# Patient Record
Sex: MALE | Race: WHITE | NOT HISPANIC OR LATINO | URBAN - METROPOLITAN AREA
[De-identification: names, ages, dates, MRNs, and addresses within clinical notes are randomized per-mention and may not be internally consistent; named-entity substitution may affect disease eponyms.]

---

## 2023-12-16 ENCOUNTER — EMERGENCY (EMERGENCY)
Facility: HOSPITAL | Age: 68
LOS: 1 days | Discharge: HOME | End: 2023-12-16
Attending: EMERGENCY MEDICINE | Admitting: EMERGENCY MEDICINE
Payer: MEDICARE

## 2023-12-16 VITALS
OXYGEN SATURATION: 100 % | DIASTOLIC BLOOD PRESSURE: 81 MMHG | TEMPERATURE: 98 F | HEART RATE: 80 BPM | SYSTOLIC BLOOD PRESSURE: 123 MMHG | RESPIRATION RATE: 17 BRPM

## 2023-12-16 LAB
ALBUMIN SERPL ELPH-MCNC: 3.9 G/DL — SIGNIFICANT CHANGE UP (ref 3.3–5)
ALBUMIN SERPL ELPH-MCNC: 3.9 G/DL — SIGNIFICANT CHANGE UP (ref 3.3–5)
ALP SERPL-CCNC: 56 U/L — SIGNIFICANT CHANGE UP (ref 40–120)
ALP SERPL-CCNC: 56 U/L — SIGNIFICANT CHANGE UP (ref 40–120)
ALT FLD-CCNC: 14 U/L — SIGNIFICANT CHANGE UP (ref 4–41)
ALT FLD-CCNC: 14 U/L — SIGNIFICANT CHANGE UP (ref 4–41)
ANION GAP SERPL CALC-SCNC: 9 MMOL/L — SIGNIFICANT CHANGE UP (ref 7–14)
ANION GAP SERPL CALC-SCNC: 9 MMOL/L — SIGNIFICANT CHANGE UP (ref 7–14)
AST SERPL-CCNC: 20 U/L — SIGNIFICANT CHANGE UP (ref 4–40)
AST SERPL-CCNC: 20 U/L — SIGNIFICANT CHANGE UP (ref 4–40)
BASOPHILS # BLD AUTO: 0.05 K/UL — SIGNIFICANT CHANGE UP (ref 0–0.2)
BASOPHILS # BLD AUTO: 0.05 K/UL — SIGNIFICANT CHANGE UP (ref 0–0.2)
BASOPHILS NFR BLD AUTO: 0.7 % — SIGNIFICANT CHANGE UP (ref 0–2)
BASOPHILS NFR BLD AUTO: 0.7 % — SIGNIFICANT CHANGE UP (ref 0–2)
BILIRUB SERPL-MCNC: 1.1 MG/DL — SIGNIFICANT CHANGE UP (ref 0.2–1.2)
BILIRUB SERPL-MCNC: 1.1 MG/DL — SIGNIFICANT CHANGE UP (ref 0.2–1.2)
BUN SERPL-MCNC: 6 MG/DL — LOW (ref 7–23)
BUN SERPL-MCNC: 6 MG/DL — LOW (ref 7–23)
CALCIUM SERPL-MCNC: 8.7 MG/DL — SIGNIFICANT CHANGE UP (ref 8.4–10.5)
CALCIUM SERPL-MCNC: 8.7 MG/DL — SIGNIFICANT CHANGE UP (ref 8.4–10.5)
CHLORIDE SERPL-SCNC: 104 MMOL/L — SIGNIFICANT CHANGE UP (ref 98–107)
CHLORIDE SERPL-SCNC: 104 MMOL/L — SIGNIFICANT CHANGE UP (ref 98–107)
CO2 SERPL-SCNC: 27 MMOL/L — SIGNIFICANT CHANGE UP (ref 22–31)
CO2 SERPL-SCNC: 27 MMOL/L — SIGNIFICANT CHANGE UP (ref 22–31)
CREAT SERPL-MCNC: 0.84 MG/DL — SIGNIFICANT CHANGE UP (ref 0.5–1.3)
CREAT SERPL-MCNC: 0.84 MG/DL — SIGNIFICANT CHANGE UP (ref 0.5–1.3)
EGFR: 94 ML/MIN/1.73M2 — SIGNIFICANT CHANGE UP
EGFR: 94 ML/MIN/1.73M2 — SIGNIFICANT CHANGE UP
EOSINOPHIL # BLD AUTO: 0.2 K/UL — SIGNIFICANT CHANGE UP (ref 0–0.5)
EOSINOPHIL # BLD AUTO: 0.2 K/UL — SIGNIFICANT CHANGE UP (ref 0–0.5)
EOSINOPHIL NFR BLD AUTO: 2.7 % — SIGNIFICANT CHANGE UP (ref 0–6)
EOSINOPHIL NFR BLD AUTO: 2.7 % — SIGNIFICANT CHANGE UP (ref 0–6)
GLUCOSE SERPL-MCNC: 89 MG/DL — SIGNIFICANT CHANGE UP (ref 70–99)
GLUCOSE SERPL-MCNC: 89 MG/DL — SIGNIFICANT CHANGE UP (ref 70–99)
HCT VFR BLD CALC: 39 % — SIGNIFICANT CHANGE UP (ref 39–50)
HCT VFR BLD CALC: 39 % — SIGNIFICANT CHANGE UP (ref 39–50)
HGB BLD-MCNC: 13.3 G/DL — SIGNIFICANT CHANGE UP (ref 13–17)
HGB BLD-MCNC: 13.3 G/DL — SIGNIFICANT CHANGE UP (ref 13–17)
IANC: 5 K/UL — SIGNIFICANT CHANGE UP (ref 1.8–7.4)
IANC: 5 K/UL — SIGNIFICANT CHANGE UP (ref 1.8–7.4)
IMM GRANULOCYTES NFR BLD AUTO: 0.4 % — SIGNIFICANT CHANGE UP (ref 0–0.9)
IMM GRANULOCYTES NFR BLD AUTO: 0.4 % — SIGNIFICANT CHANGE UP (ref 0–0.9)
LYMPHOCYTES # BLD AUTO: 1.51 K/UL — SIGNIFICANT CHANGE UP (ref 1–3.3)
LYMPHOCYTES # BLD AUTO: 1.51 K/UL — SIGNIFICANT CHANGE UP (ref 1–3.3)
LYMPHOCYTES # BLD AUTO: 20.3 % — SIGNIFICANT CHANGE UP (ref 13–44)
LYMPHOCYTES # BLD AUTO: 20.3 % — SIGNIFICANT CHANGE UP (ref 13–44)
MCHC RBC-ENTMCNC: 32.7 PG — SIGNIFICANT CHANGE UP (ref 27–34)
MCHC RBC-ENTMCNC: 32.7 PG — SIGNIFICANT CHANGE UP (ref 27–34)
MCHC RBC-ENTMCNC: 34.1 GM/DL — SIGNIFICANT CHANGE UP (ref 32–36)
MCHC RBC-ENTMCNC: 34.1 GM/DL — SIGNIFICANT CHANGE UP (ref 32–36)
MCV RBC AUTO: 95.8 FL — SIGNIFICANT CHANGE UP (ref 80–100)
MCV RBC AUTO: 95.8 FL — SIGNIFICANT CHANGE UP (ref 80–100)
MONOCYTES # BLD AUTO: 0.65 K/UL — SIGNIFICANT CHANGE UP (ref 0–0.9)
MONOCYTES # BLD AUTO: 0.65 K/UL — SIGNIFICANT CHANGE UP (ref 0–0.9)
MONOCYTES NFR BLD AUTO: 8.7 % — SIGNIFICANT CHANGE UP (ref 2–14)
MONOCYTES NFR BLD AUTO: 8.7 % — SIGNIFICANT CHANGE UP (ref 2–14)
NEUTROPHILS # BLD AUTO: 5 K/UL — SIGNIFICANT CHANGE UP (ref 1.8–7.4)
NEUTROPHILS # BLD AUTO: 5 K/UL — SIGNIFICANT CHANGE UP (ref 1.8–7.4)
NEUTROPHILS NFR BLD AUTO: 67.2 % — SIGNIFICANT CHANGE UP (ref 43–77)
NEUTROPHILS NFR BLD AUTO: 67.2 % — SIGNIFICANT CHANGE UP (ref 43–77)
NRBC # BLD: 0 /100 WBCS — SIGNIFICANT CHANGE UP (ref 0–0)
NRBC # BLD: 0 /100 WBCS — SIGNIFICANT CHANGE UP (ref 0–0)
NRBC # FLD: 0 K/UL — SIGNIFICANT CHANGE UP (ref 0–0)
NRBC # FLD: 0 K/UL — SIGNIFICANT CHANGE UP (ref 0–0)
PLATELET # BLD AUTO: 135 K/UL — LOW (ref 150–400)
PLATELET # BLD AUTO: 135 K/UL — LOW (ref 150–400)
POTASSIUM SERPL-MCNC: 4.1 MMOL/L — SIGNIFICANT CHANGE UP (ref 3.5–5.3)
POTASSIUM SERPL-MCNC: 4.1 MMOL/L — SIGNIFICANT CHANGE UP (ref 3.5–5.3)
POTASSIUM SERPL-SCNC: 4.1 MMOL/L — SIGNIFICANT CHANGE UP (ref 3.5–5.3)
POTASSIUM SERPL-SCNC: 4.1 MMOL/L — SIGNIFICANT CHANGE UP (ref 3.5–5.3)
PROT SERPL-MCNC: 6.6 G/DL — SIGNIFICANT CHANGE UP (ref 6–8.3)
PROT SERPL-MCNC: 6.6 G/DL — SIGNIFICANT CHANGE UP (ref 6–8.3)
RBC # BLD: 4.07 M/UL — LOW (ref 4.2–5.8)
RBC # BLD: 4.07 M/UL — LOW (ref 4.2–5.8)
RBC # FLD: 11.8 % — SIGNIFICANT CHANGE UP (ref 10.3–14.5)
RBC # FLD: 11.8 % — SIGNIFICANT CHANGE UP (ref 10.3–14.5)
SODIUM SERPL-SCNC: 140 MMOL/L — SIGNIFICANT CHANGE UP (ref 135–145)
SODIUM SERPL-SCNC: 140 MMOL/L — SIGNIFICANT CHANGE UP (ref 135–145)
TROPONIN T, HIGH SENSITIVITY RESULT: 12 NG/L — SIGNIFICANT CHANGE UP
TROPONIN T, HIGH SENSITIVITY RESULT: 12 NG/L — SIGNIFICANT CHANGE UP
TROPONIN T, HIGH SENSITIVITY RESULT: 13 NG/L — SIGNIFICANT CHANGE UP
TROPONIN T, HIGH SENSITIVITY RESULT: 13 NG/L — SIGNIFICANT CHANGE UP
WBC # BLD: 7.44 K/UL — SIGNIFICANT CHANGE UP (ref 3.8–10.5)
WBC # BLD: 7.44 K/UL — SIGNIFICANT CHANGE UP (ref 3.8–10.5)
WBC # FLD AUTO: 7.44 K/UL — SIGNIFICANT CHANGE UP (ref 3.8–10.5)
WBC # FLD AUTO: 7.44 K/UL — SIGNIFICANT CHANGE UP (ref 3.8–10.5)

## 2023-12-16 PROCEDURE — 99223 1ST HOSP IP/OBS HIGH 75: CPT

## 2023-12-16 PROCEDURE — 71045 X-RAY EXAM CHEST 1 VIEW: CPT | Mod: 26

## 2023-12-16 PROCEDURE — 93010 ELECTROCARDIOGRAM REPORT: CPT

## 2023-12-16 RX ORDER — ASPIRIN/CALCIUM CARB/MAGNESIUM 324 MG
324 TABLET ORAL ONCE
Refills: 0 | Status: COMPLETED | OUTPATIENT
Start: 2023-12-16 | End: 2023-12-16

## 2023-12-16 RX ORDER — ASPIRIN/CALCIUM CARB/MAGNESIUM 324 MG
81 TABLET ORAL DAILY
Refills: 0 | Status: DISCONTINUED | OUTPATIENT
Start: 2023-12-17 | End: 2023-12-20

## 2023-12-16 RX ADMIN — Medication 324 MILLIGRAM(S): at 21:36

## 2023-12-16 NOTE — ED CDU PROVIDER INITIAL DAY NOTE - PHYSICAL EXAMINATION
CONSTITUTIONAL:  Well appearing, awake, alert, oriented to person, place, time/situation and in no apparent distress.  Pt. is objectively comfortable appearing and verbalizing in full, clear, effortless sentences.  ENMT: NC/AT.  Airway patent.  Nasal mucosa clear.  Moist mucous membranes.  Neck supple.  EYES:  Clear OU.  CARDIAC:  Bradycardic rate (50's on monitor), regular rhythm.  Heart sounds S1 S2.  No murmurs, gallops, or rubs.  RESPIRATORY:  Breath sounds clear and equal bilaterally.  No wheezes, no rales, no rhonchi.  GASTROINTESTINAL:  Abdomen soft, non-distended, non-tender.  No rebound, no guarding.  NEUROLOGICAL:  Alert and oriented to person/place/time/situation.  No focal deficits; no tremors noted.   MUSCULOSKELETAL:  Range of motion is not limited.    SKIN:  Skin color unremarkable.  Skin warm, dry, and intact.    PSYCHIATRIC:  Alert and oriented to person/place/time/situation.  Mood and affect WNL.  No apparent risk to self or others.

## 2023-12-16 NOTE — ED CDU PROVIDER INITIAL DAY NOTE - OBJECTIVE STATEMENT
68-year-old male history of HTN, HLD presenting for chest pain.  The patient reports wher he woke up and he took his blood pressure and it was in the 200s systolically.  The patient reports at that time he was having chest pain across the chest.  Not localized to any region that lasted for a few seconds. The patient reports the pain was worse with deep inspiration. The patient did not have any nausea or vomiting with this chest pain and patient did not have any radiation to his arm or jaw.  The patient states that though he has high blood pressure it's normally never this hgh. The patient went to urgent care was given 1 clonidine and sent   The patient reports that he recently had a runny nose and hs been drenched at night. The patient denies nausea, vomiting, fevers, chills, chest pain or shortness of breath.    CDU ANGIE Peter Note----  ED Provider HPI as above, reviewed.  Pt is a 69 yo male, PMH HTN, hyperlipidemia, presented to the ED c/o intermittent transient episodes of anterior chest pain, non-radiating, lasting seconds when occurring; occasionally pt noted some increase in pain when he took a deep breath while pain was occurring, but then these symptoms would resolve when chest pain resolved; pt also noted elevated SBP readings.  In the ED, VSS, pt afebrile.  Initial BP in /81.  EKG sinus bradycardia @ 55 bpm with no ischemic morphology noted.  WBC 7.44, Hb 13.3; CMP unremarkable.  Troponin 12 -----> 13.  CXR was performed; per official radiology report: "...IMPRESSION: Clear lungs.".  Pt was given aspirin; pt was sent to CDU for continued care plan:  Tele monitoring, stress test, echo, Tele Doc of Day evaluation, general observation care / monitoring. 68-year-old male history of HTN, HLD presenting for chest pain.  The patient reports wher he woke up and he took his blood pressure and it was in the 200s systolically.  The patient reports at that time he was having chest pain across the chest.  Not localized to any region that lasted for a few seconds. The patient reports the pain was worse with deep inspiration. The patient did not have any nausea or vomiting with this chest pain and patient did not have any radiation to his arm or jaw.  The patient states that though he has high blood pressure it's normally never this hgh. The patient went to urgent care was given 1 clonidine and sent   The patient reports that he recently had a runny nose and hs been drenched at night. The patient denies nausea, vomiting, fevers, chills, chest pain or shortness of breath.    CDU ANGIE Peter Note----  ED Provider HPI as above, reviewed.  Pt is a 67 yo male, PMH HTN, hyperlipidemia, presented to the ED c/o intermittent transient episodes of anterior chest pain, non-radiating, lasting seconds when occurring; occasionally pt noted some increase in pain when he took a deep breath while pain was occurring, but then these symptoms would resolve when chest pain resolved; pt also noted elevated SBP readings.  In the ED, VSS, pt afebrile.  Initial BP in /81.  EKG sinus bradycardia @ 55 bpm with no ischemic morphology noted.  WBC 7.44, Hb 13.3; CMP unremarkable.  Troponin 12 -----> 13.  CXR was performed; per official radiology report: "...IMPRESSION: Clear lungs.".  Pt was given aspirin; pt was sent to CDU for continued care plan:  Tele monitoring, stress test, echo, Tele Doc of Day evaluation, general observation care / monitoring.

## 2023-12-16 NOTE — ED PROVIDER NOTE - CLINICAL SUMMARY MEDICAL DECISION MAKING FREE TEXT BOX
68-year-old male history of HTN, HLD presenting for chest pain. The patient reports where he woke up and he took his blood pressure and it was in the 200s systolically. VSS. PE. no acute findings.    Differentials not limited to hematological/electrolyte derangement, ACS, pleural/pericardial effusions, low concern at this time for PE.  Patient's vitals are stable.  Patient has not traveled recently and is not on any hormone therapy and does not use tobacco products.  Will obtain basic labs, cardiac labs, type and screen.  Dispo pending labs imaging and reassessment.  Patient likely to be admitted secondary to his weakness and decreased appetite.

## 2023-12-16 NOTE — ED CDU PROVIDER INITIAL DAY NOTE - NS ED ATTENDING STATEMENT MOD
This was a shared visit with the HUY. I reviewed and verified the documentation and independently performed the documented:

## 2023-12-16 NOTE — ED PROVIDER NOTE - ATTENDING CONTRIBUTION TO CARE
Attending note:   After face to face evaluation of this patient, I concur with above noted hx, pe, and care plan for this patient.  Dominguez: 68-year-old male with history of hypertension hyperlipidemia.  Although patient states he is not currently on medications for blood pressure and visit with PMD 1 week ago blood pressure was in the 140s.  Patient states that for the last 24 hours he has been having intermittent left-sided chest pain with exertion and occasionally with inspiration.  His blood pressure was taken to be 200 and urgent care he was globin clonidine.  Patient is not currently symptomatic.  Patient's blood pressure here is normal.  Patient's physical exam shows no significant findings including no pitting edema and pulses equal strong in all extremities.  Lungs are clear and heart is regular rate and rhythm.  There is no reproducible chest wall tenderness.  Patient's EKG shows no ST or T wave changes.  Patient has had no previous cardiac workup and states he has never had a stress test or seen a cardiologist.  Given age and blood pressure of 200 today which was treated with clonidine and chest pain patient should have cardiac workup.  Although heart score is low patient's follow-up may be difficult given he is currently in a homeless shelter.  For this reason either CDU or admission offered.

## 2023-12-16 NOTE — ED ADULT NURSE NOTE - OBJECTIVE STATEMENT
Received pt to bed 12a, A+Ox4, ambulatory. C/O chest pain and HTN, pt brought from urgent care. Respirations even and unlabored, normal work of breathing, no accessory muscle use, speaking in full clear uninterrupted sentences. Sinus merrick on cardiac monitor. ABD is soft, non tender, non distended. Pt denies any SOB, headache, dizziness, N+V, diarrhea, fever, chills.  20G to LAC, Labs sent, will continue to monitor.

## 2023-12-16 NOTE — ED PROVIDER NOTE - OBJECTIVE STATEMENT
68-year-old male history of HTN, HLD presenting for chest pain.  The patient reports wher he woke up and he took his blood pressure and it was in the 200s systolically.  The patient reports at that time he was having chest pain across the chest.  Not localized to any region that lasted for a few seconds. The patient reports the pain was worse with deep inspiration. The patient did not have any nausea or vomiting with this chest pain and patient did not have any radiation to his arm or jaw.  The patient states that though he has high blood pressure it's normally never this hgh. The patient went to urgent care was given 1 clonidine and sent   The patient reports that he recently had a runny nose and hs been drenched at night. The patient denies nausea, vomiting, fevers, chills, chest pain or shortness of breath.

## 2023-12-16 NOTE — ED ADULT NURSE NOTE - NSFALLUNIVINTERV_ED_ALL_ED
Bed/Stretcher in lowest position, wheels locked, appropriate side rails in place/Call bell, personal items and telephone in reach/Instruct patient to call for assistance before getting out of bed/chair/stretcher/Non-slip footwear applied when patient is off stretcher/[place] to call system/Physically safe environment - no spills, clutter or unnecessary equipment/Purposeful proactive rounding/Room/bathroom lighting operational, light cord in reach Bed/Stretcher in lowest position, wheels locked, appropriate side rails in place/Call bell, personal items and telephone in reach/Instruct patient to call for assistance before getting out of bed/chair/stretcher/Non-slip footwear applied when patient is off stretcher/[place] to call system/Physically safe environment - no spills, clutter or unnecessary equipment/Purposeful proactive rounding/Room/bathroom lighting operational, light cord in reach

## 2023-12-16 NOTE — ED ADULT TRIAGE NOTE - NS ED NURSE AMBULANCES
[facility]

## 2023-12-16 NOTE — ED ADULT NURSE NOTE - CHIEF COMPLAINT QUOTE
Pt brought in from urgent care for chest pain and hypertension. Pt received .1 Clonidine 1 hour ago by Urgent Care. Pt hyper Alevism and hard to interrupt in triage. Pt denies psych history. Denies SI/HI/VH/AH Pt brought in from urgent care for chest pain and hypertension. Pt received .1 Clonidine 1 hour ago by Urgent Care. Pt hyper Episcopalian and hard to interrupt in triage. Pt denies psych history. Denies SI/HI/VH/AH

## 2023-12-16 NOTE — ED ADULT TRIAGE NOTE - CHIEF COMPLAINT QUOTE
Pt brought in from urgent care for chest pain and hypertension. Pt received .1 Clonidine 1 hour ago by Urgent Care. Pt hyper Adventist and hard to interrupt in triage. Pt denies psych history. Denies SI/HI/VH/AH Pt brought in from urgent care for chest pain and hypertension. Pt received .1 Clonidine 1 hour ago by Urgent Care. Pt hyper Roman Catholic and hard to interrupt in triage. Pt denies psych history. Denies SI/HI/VH/AH

## 2023-12-16 NOTE — ED ADULT NURSE REASSESSMENT NOTE - NS ED NURSE REASSESS COMMENT FT1
Received report from RN @2015. Pt is awake and alert, appears comfortable and in no apparent distress. Patient presents to ED for elevated SBP at home and 1 episode of chest pain. Admitted to CDU and awaiting a bed. Denies chest pain, SOB, weakness, nausea or vomiting at this time. Connected to cardiac monitor @ sinus rhythm. Awaiting further orders.

## 2023-12-16 NOTE — ED ADULT NURSE NOTE - NS ED NURSE IV DC DT
17-Dec-2023 13:30 Libtayo Pregnancy And Lactation Text: This medication is contraindicated in pregnancy and when breast feeding.

## 2023-12-17 VITALS
TEMPERATURE: 98 F | RESPIRATION RATE: 18 BRPM | OXYGEN SATURATION: 100 % | HEART RATE: 59 BPM | DIASTOLIC BLOOD PRESSURE: 85 MMHG | SYSTOLIC BLOOD PRESSURE: 138 MMHG

## 2023-12-17 LAB
CHOLEST SERPL-MCNC: 134 MG/DL — SIGNIFICANT CHANGE UP
CHOLEST SERPL-MCNC: 134 MG/DL — SIGNIFICANT CHANGE UP
HDLC SERPL-MCNC: 30 MG/DL — LOW
HDLC SERPL-MCNC: 30 MG/DL — LOW
LIPID PNL WITH DIRECT LDL SERPL: 88 MG/DL — SIGNIFICANT CHANGE UP
LIPID PNL WITH DIRECT LDL SERPL: 88 MG/DL — SIGNIFICANT CHANGE UP
NON HDL CHOLESTEROL: 104 MG/DL — SIGNIFICANT CHANGE UP
NON HDL CHOLESTEROL: 104 MG/DL — SIGNIFICANT CHANGE UP
TRIGL SERPL-MCNC: 78 MG/DL — SIGNIFICANT CHANGE UP
TRIGL SERPL-MCNC: 78 MG/DL — SIGNIFICANT CHANGE UP

## 2023-12-17 PROCEDURE — 93306 TTE W/DOPPLER COMPLETE: CPT | Mod: 26

## 2023-12-17 PROCEDURE — 93018 CV STRESS TEST I&R ONLY: CPT | Mod: GC

## 2023-12-17 PROCEDURE — 93016 CV STRESS TEST SUPVJ ONLY: CPT | Mod: GC

## 2023-12-17 PROCEDURE — 99239 HOSP IP/OBS DSCHRG MGMT >30: CPT

## 2023-12-17 RX ADMIN — Medication 81 MILLIGRAM(S): at 12:20

## 2023-12-17 NOTE — CONSULT NOTE ADULT - ASSESSMENT
67 yo male, PMH HTN, hyperlipidemia, presented to the ED c/o intermittent transient episodes of anterior chest pain    -CV stable  -exercise stress normal with low risk DTS(pt refused nuclear)  -TTE unremarkable  -clear for DC  -outpt followup  -d/w ER    75 minutes spent on total encounter; more than 50% of the visit was spent counseling and/or coordinating care by the attending physician.   69 yo male, PMH HTN, hyperlipidemia, presented to the ED c/o intermittent transient episodes of anterior chest pain    -CV stable  -exercise stress normal with low risk DTS(pt refused nuclear)  -TTE unremarkable  -clear for DC  -outpt followup  -d/w ER    85 minutes spent on total encounter; more than 50% of the visit was spent counseling and/or coordinating care by the attending physician.

## 2023-12-17 NOTE — PROVIDER CONTACT NOTE (OTHER) - ASSESSMENT
RICHARD called [facility] and received auth #[id number] for taxi transport by [employer] to [address] [place] [address]. RICHARD called [facility] and received auth #[id number] for taxi transport by [employer] to [address] [place] [address]. RICHARD called [facility] and received auth #[id number] for taxi transport by [employer] to [address] [place] [address].  RICHARD called [facility] and received ETA. Patient picked up at 1:57p.m.  No further RICHARD intervention needed. RICHARD called [facility] and received auth #[id number] for taxi transport by [employer] to [address] [place] [address].  RICHARD called [facility] and received ETA. Patient picked up at 1:57p.m.  No further RICHARD intervention needed.

## 2023-12-17 NOTE — ED CDU PROVIDER DISPOSITION NOTE - CLINICAL COURSE
67 yo male, PMH HTN, hyperlipidemia, presented to the ED c/o intermittent transient episodes of anterior chest pain, non-radiating, lasting seconds when occurring; occasionally pt noted some increase in pain when he took a deep breath while pain was occurring, but then these symptoms would resolve when chest pain resolved; pt also noted elevated SBP readings.  Pt was seen at urgent care prior to ED visit and given dose of clonidine for elevated BP.  In the ED, VSS, pt afebrile.  Initial BP in /81.  EKG sinus bradycardia @ 55 bpm with no ischemic morphology noted.  WBC 7.44, Hb 13.3; CMP unremarkable.  Troponin 12 -----> 13.  CXR was performed; per official radiology report: "...IMPRESSION: Clear lungs.".  Pt was given aspirin; pt was sent to CDU for continued care plan:  Tele monitoring, stress test, echo, Tele Doc of Day evaluation, general observation care / monitoring.  Patient did not want nuclear stress, changed to regular treadmill test, seen by tele doc Dr. Pandya,  echo and stress results negative. vss.  will dc with outpatient follow up. strict return precautions given. 69 yo male, PMH HTN, hyperlipidemia, presented to the ED c/o intermittent transient episodes of anterior chest pain, non-radiating, lasting seconds when occurring; occasionally pt noted some increase in pain when he took a deep breath while pain was occurring, but then these symptoms would resolve when chest pain resolved; pt also noted elevated SBP readings.  Pt was seen at urgent care prior to ED visit and given dose of clonidine for elevated BP.  In the ED, VSS, pt afebrile.  Initial BP in /81.  EKG sinus bradycardia @ 55 bpm with no ischemic morphology noted.  WBC 7.44, Hb 13.3; CMP unremarkable.  Troponin 12 -----> 13.  CXR was performed; per official radiology report: "...IMPRESSION: Clear lungs.".  Pt was given aspirin; pt was sent to CDU for continued care plan:  Tele monitoring, stress test, echo, Tele Doc of Day evaluation, general observation care / monitoring.  Patient did not want nuclear stress, changed to regular treadmill test, seen by tele doc Dr. Pandya,  echo and stress results negative. vss.  will dc with outpatient follow up. strict return precautions given. All results d/w him and copies given.

## 2023-12-17 NOTE — CHART NOTE - NSCHARTNOTEFT_GEN_A_CORE
Patient ordered for nuclear stress test. Patient adamantly refusing nuclear as he reports he would not like to receive any more radiation. Explained to patient that with his risk factors (age, hypertension, hyperlipidemia) that benefits outweigh risks  of receiving nuclear as he presented to the hospital with chest pain and that he would receive a very low dose of radiation. Patient continues to refuse nuclear and prefers exercise stress test. CDU ACP made aware and will order exercise stress test (non-imaging).    Bettie Avendano PA-C  Nuclear Stress Lab  [phone] Patient ordered for nuclear stress test. Patient adamantly refusing nuclear as he reports he would not like to receive any more radiation. Explained to patient that with his risk factors (age, hypertension, hyperlipidemia) that benefits outweigh risks  of receiving nuclear as he presented to the hospital with chest pain and that he would receive a very low dose of radiation. Patient continues to refuse nuclear and prefers exercise stress test. CDU ACP made aware and will order exercise stress test (non-imaging).    Bettie Avendano PA-C  Nuclear Stress Lab  [phone] Patient ordered for nuclear stress test. Patient adamantly refusing nuclear as he reports he would not like to receive any more radiation. Explained to patient that with his risk factors (age, hypertension, hyperlipidemia, family history) that benefits outweigh risks  of receiving nuclear as he presented to the hospital with chest pain and that he would receive a very low dose of radiation. Patient continues to refuse nuclear and prefers exercise stress test. CDU ACP made aware and will order exercise stress test (non-imaging).    Bettie Avendano PA-C  Nuclear Stress Lab  [phone] Patient ordered for nuclear stress test. Patient adamantly refusing nuclear as he reports he would not like to receive any more radiation. Explained to patient that with his risk factors (age, hypertension, hyperlipidemia, family history) that benefits outweigh risks  of receiving nuclear as he presented to the hospital with chest pain and that he would receive a very low dose of radiation. Patient continues to refuse nuclear and prefers exercise stress test. CDU ACP made aware and will order exercise stress test (non-imaging).    Bettie Avendano PA-C  Nuclear Stress Lab  [phone]

## 2023-12-17 NOTE — ED CDU PROVIDER DISPOSITION NOTE - PATIENT PORTAL LINK FT
You can access the FollowMyHealth Patient Portal offered by [facility] by registering at the following website: [email or web address]. By joining [facility]’s FollowMyHealth portal, you will also be able to view your health information using other applications (apps) compatible with our system. You can access the FollowMyHealth Patient Portal offered by [facility] by registering at the following website: [email or web address]. By joining [employer]’s FollowMyHealth portal, you will also be able to view your health information using other applications (apps) compatible with our system.

## 2023-12-17 NOTE — ED CDU PROVIDER SUBSEQUENT DAY NOTE - ATTENDING APP SHARED VISIT CONTRIBUTION OF CARE
evaluated patient on am rounds. Patient reporting no symptoms, feeling well, tolerated stress with no difficulty, declined nuclear stress but allowed for exercise stress.  on exam  A & O x 3, NAD, HEENT WNL and no facial asymmetry; lungs CTAB, heart with reg rhythm without murmur; abdomen soft NTND; extremities with no edema; neuro exam non focal with no motor or sensory deficits.  Plan to f/u test results, c/w monitoring, routine cdu care. All results thus far d/w patient.

## 2023-12-17 NOTE — ED CDU PROVIDER SUBSEQUENT DAY NOTE - PROGRESS NOTE DETAILS
Patient did not want nuclear stress, changed to regular treadmill test, seen by tele doc Dr. Pandya,  echo and stress results negative. vss.  will dc with outpatient follow up. strict return precautions given.

## 2023-12-17 NOTE — ED CDU PROVIDER DISPOSITION NOTE - NSFOLLOWUPINSTRUCTIONS_ED_ALL_ED_FT
Take aspirin 81mg daily. Continue taking all your medications previously prescribed. Drink plenty of fluids. Rest; avoid any strenuous activity. Follow up with your PMD in 2 days-bring all the copies of test results and discharge instructions given to you. Return to ED for any worsening symptoms.

## 2023-12-17 NOTE — ED CDU PROVIDER SUBSEQUENT DAY NOTE - HISTORY
69 yo male, PMH HTN, hyperlipidemia, presented to the ED c/o intermittent transient episodes of anterior chest pain, non-radiating, lasting seconds when occurring; occasionally pt noted some increase in pain when he took a deep breath while pain was occurring, but then these symptoms would resolve when chest pain resolved; pt also noted elevated SBP readings.  Pt was seen at urgent care prior to ED visit and given dose of clonidine for elevated BP.  In the ED, VSS, pt afebrile.  Initial BP in /81.  EKG sinus bradycardia @ 55 bpm with no ischemic morphology noted.  WBC 7.44, Hb 13.3; CMP unremarkable.  Troponin 12 -----> 13.  CXR was performed; per official radiology report: "...IMPRESSION: Clear lungs.".  Pt was given aspirin; pt was sent to CDU for continued care plan:  Tele monitoring, stress test, echo, Tele Doc of Day evaluation, general observation care / monitoring.  In the interim, pt objectively noted to be resting comfortably; pt has been clinically stable; no issues thus far.

## 2023-12-17 NOTE — CONSULT NOTE ADULT - SUBJECTIVE AND OBJECTIVE BOX
CARDIOLOGY CONSULT - Dr. Pandya  Date of Service: 12/17/2023      HPI:    69 yo male, PMH HTN, hyperlipidemia, presented to the ED c/o intermittent transient episodes of anterior chest pain, non-radiating, lasting seconds when occurring; occasionally pt noted some increase in pain when he took a deep breath while pain was occurring, but then these symptoms would resolve when chest pain resolved; pt also noted elevated SBP readings.  Pt was seen at urgent care prior to ED visit and given dose of clonidine for elevated BP.  In the ED, VSS, pt afebrile.  Initial BP in /81.  EKG sinus bradycardia @ 55 bpm with no ischemic morphology noted.  WBC 7.44, Hb 13.3; CMP unremarkable.  Troponin 12 -----> 13.  CXR was performed; per official radiology report: "...IMPRESSION: Clear lungs.".  Pt was given aspirin; pt was sent to [facility] for continued care plan:     PAST MEDICAL & SURGICAL HISTORY:  HTN (hypertension)      HLD (hyperlipidemia)      No significant past surgical history              PREVIOUS DIAGNOSTIC TESTING:    [ ] Echocardiogram:  [ ]  Catheterization:  [ ] Stress Test:  	    MEDICATIONS:  MEDICATIONS  (STANDING):  aspirin enteric coated 81 milliGRAM(s) Oral daily      FAMILY HISTORY:  No pertinent family history in first degree relatives        SOCIAL HISTORY:    [ ] Non-smoker  [ ] Smoker  [ ] Alcohol    Allergies    No Known Allergies    Intolerances    	    REVIEW OF SYSTEMS:  CONSTITUTIONAL: No fever, weight loss, or fatigue  EYES: No eye pain, visual disturbances, or discharge  ENMT:  No difficulty hearing, tinnitus, vertigo; No sinus or throat pain  NECK: No pain or stiffness  RESPIRATORY: No cough, wheezing, chills or hemoptysis; No Shortness of Breath  CARDIOVASCULAR: No chest pain, palpitations, passing out, dizziness, or leg swelling  GASTROINTESTINAL: No abdominal or epigastric pain. No nausea, vomiting, or hematemesis; No diarrhea or constipation. No melena or hematochezia.  GENITOURINARY: No dysuria, frequency, hematuria, or incontinence  NEUROLOGICAL: No headaches, memory loss, loss of strength, numbness, or tremors  SKIN: No itching, burning, rashes, or lesions   	    [ ] All others negative	  [ ] Unable to obtain    PHYSICAL EXAM:  T(C): 36.7 (12-17-23 @ 09:44), Max: 36.7 (12-16-23 @ 14:30)  HR: 59 (12-17-23 @ 09:44) (55 - 80)  BP: 138/85 (12-17-23 @ 09:44) (116/62 - 149/83)  RR: 18 (12-17-23 @ 09:44) (17 - 18)  SpO2: 100% (12-17-23 @ 09:44) (98% - 100%)  Wt(kg): --  I&O's Summary      Appearance: Normal	  Psychiatry: A & O x 3, Mood & affect appropriate  HEENT:   Normal oral mucosa, PERRL, EOMI	  Lymphatic: No lymphadenopathy  Cardiovascular: Normal S1 S2,RRR, No JVD, No murmurs  Respiratory: Lungs clear to auscultation	  Gastrointestinal:  Soft, Non-tender, + BS	  Skin: No rashes, No ecchymoses, No cyanosis	  Neurologic: Non-focal  Extremities: Normal range of motion, No clubbing, cyanosis or edema  Vascular: Peripheral pulses palpable 2+ bilaterally    TELEMETRY: 	    ECG:  	  RADIOLOGY:  OTHER: 	  	  LABS:	 	    CARDIAC MARKERS:                                  13.3   7.44  )-----------( 135      ( 16 Dec 2023 15:05 )             39.0     12-16    140  |  104  |  6<L>  ----------------------------<  89  4.1   |  27  |  0.84    Ca    8.7      16 Dec 2023 15:05    TPro  6.6  /  Alb  3.9  /  TBili  1.1  /  DBili  x   /  AST  20  /  ALT  14  /  AlkPhos  56  12-16      proBNP:   Lipid Profile:   HgA1c:   TSH:     ASSESSMENT/PLAN: 	              70 minutes spent on total encounter; more than 50% of the visit was spent counseling and/or coordinating care by the attending physician.     CARDIOLOGY CONSULT - Dr. Pandya  Date of Service: 12/17/2023      HPI:    67 yo male, PMH HTN, hyperlipidemia, presented to the ED c/o intermittent transient episodes of anterior chest pain, non-radiating, lasting seconds when occurring; occasionally pt noted some increase in pain when he took a deep breath while pain was occurring, but then these symptoms would resolve when chest pain resolved; pt also noted elevated SBP readings.  Pt was seen at urgent care prior to ED visit and given dose of clonidine for elevated BP.  In the ED, VSS, pt afebrile.  Initial BP in /81.  EKG sinus bradycardia @ 55 bpm with no ischemic morphology noted.  WBC 7.44, Hb 13.3; CMP unremarkable.  Troponin 12 -----> 13.  CXR was performed; per official radiology report: "...IMPRESSION: Clear lungs.".  Pt was given aspirin; pt was sent to [facility] for continued care plan:     PAST MEDICAL & SURGICAL HISTORY:  HTN (hypertension)      HLD (hyperlipidemia)      No significant past surgical history              PREVIOUS DIAGNOSTIC TESTING:    [ ] Echocardiogram:  [ ]  Catheterization:  [ ] Stress Test:  	    MEDICATIONS:  MEDICATIONS  (STANDING):  aspirin enteric coated 81 milliGRAM(s) Oral daily      FAMILY HISTORY:  No pertinent family history in first degree relatives        SOCIAL HISTORY:    [ ] Non-smoker  [ ] Smoker  [ ] Alcohol    Allergies    No Known Allergies    Intolerances    	    REVIEW OF SYSTEMS:  CONSTITUTIONAL: No fever, weight loss, or fatigue  EYES: No eye pain, visual disturbances, or discharge  ENMT:  No difficulty hearing, tinnitus, vertigo; No sinus or throat pain  NECK: No pain or stiffness  RESPIRATORY: No cough, wheezing, chills or hemoptysis; No Shortness of Breath  CARDIOVASCULAR: No chest pain, palpitations, passing out, dizziness, or leg swelling  GASTROINTESTINAL: No abdominal or epigastric pain. No nausea, vomiting, or hematemesis; No diarrhea or constipation. No melena or hematochezia.  GENITOURINARY: No dysuria, frequency, hematuria, or incontinence  NEUROLOGICAL: No headaches, memory loss, loss of strength, numbness, or tremors  SKIN: No itching, burning, rashes, or lesions   	    [ ] All others negative	  [ ] Unable to obtain    PHYSICAL EXAM:  T(C): 36.7 (12-17-23 @ 09:44), Max: 36.7 (12-16-23 @ 14:30)  HR: 59 (12-17-23 @ 09:44) (55 - 80)  BP: 138/85 (12-17-23 @ 09:44) (116/62 - 149/83)  RR: 18 (12-17-23 @ 09:44) (17 - 18)  SpO2: 100% (12-17-23 @ 09:44) (98% - 100%)  Wt(kg): --  I&O's Summary      Appearance: Normal	  Psychiatry: A & O x 3, Mood & affect appropriate  HEENT:   Normal oral mucosa, PERRL, EOMI	  Lymphatic: No lymphadenopathy  Cardiovascular: Normal S1 S2,RRR, No JVD, No murmurs  Respiratory: Lungs clear to auscultation	  Gastrointestinal:  Soft, Non-tender, + BS	  Skin: No rashes, No ecchymoses, No cyanosis	  Neurologic: Non-focal  Extremities: Normal range of motion, No clubbing, cyanosis or edema  Vascular: Peripheral pulses palpable 2+ bilaterally    TELEMETRY: 	    ECG:  	  RADIOLOGY:  OTHER: 	  	  LABS:	 	    CARDIAC MARKERS:                                  13.3   7.44  )-----------( 135      ( 16 Dec 2023 15:05 )             39.0     12-16    140  |  104  |  6<L>  ----------------------------<  89  4.1   |  27  |  0.84    Ca    8.7      16 Dec 2023 15:05    TPro  6.6  /  Alb  3.9  /  TBili  1.1  /  DBili  x   /  AST  20  /  ALT  14  /  AlkPhos  56  12-16      proBNP:   Lipid Profile:   HgA1c:   TSH:     ASSESSMENT/PLAN: 	              70 minutes spent on total encounter; more than 50% of the visit was spent counseling and/or coordinating care by the attending physician.